# Patient Record
Sex: FEMALE | Race: WHITE
[De-identification: names, ages, dates, MRNs, and addresses within clinical notes are randomized per-mention and may not be internally consistent; named-entity substitution may affect disease eponyms.]

---

## 2018-03-07 ENCOUNTER — HOSPITAL ENCOUNTER (OUTPATIENT)
Dept: HOSPITAL 24 - RAD | Age: 63
Discharge: HOME | DRG: 552 | End: 2018-03-07
Attending: INTERNAL MEDICINE
Payer: COMMERCIAL

## 2018-03-07 DIAGNOSIS — M51.36: Primary | ICD-10-CM

## 2018-03-07 DIAGNOSIS — M53.3: ICD-10-CM

## 2018-03-07 PROCEDURE — 72110 X-RAY EXAM L-2 SPINE 4/>VWS: CPT

## 2018-03-07 PROCEDURE — 73521 X-RAY EXAM HIPS BI 2 VIEWS: CPT

## 2018-03-07 NOTE — RAD
HISTORY:  Bilateral hip pain.  No known trauma.



Study:  Bilateral hips:  Two views each



Comparison:  None



Findings:  



The pelvic ring is intact.  Mild degenerative changes present in the sacroiliac joints.  There is a s
clerotic lesion involving the left sacral ala measuring approximately 18 mm in maximum dimension.  Th
is may be a bone island.  Further evaluation is recommended.  Vascular stents are present in the comm
on iliac arteries.  Moderate vascular calcification is noted in the course of the right common iliac 
artery.  The pubic rami are intact.



Both hips show a normal-appearing acetabulum.  The femoral head contours and femoral necks are intact
.



IMPRESSION:

1.  Sclerotic lesion projected over the left sacral ala of unknown etiology.  This most likely is a b
one island.  Comparison to prior films recommended.  I do not have any available from this facility. 
 As the patient has had iliac artery stents placed, there most likely are prior examinations at [facility] facility.  If these can be made available an addendum to this dictation can be given.

2.  Otherwise, negative radiographs of the hips.







Reported By:Electronically Signed by LANIE GUTIERREZ MD at 3/7/2018 1:30:36 PM

## 2018-03-07 NOTE — RAD
HISTORY:  Low back pain.  Bilateral hip pain.



Study:  Lumbar spine with obliques



Comparison:  None



Findings:  



Five lumbar type vertebra present.  Mild facet arthropathy is noted throughout with mild-to-moderate 
at L3/L4 and L4/L5 and moderate to moderately severe at L5/S1.  Mild degenerative changes noted in th
e sacroiliac joints.  A sclerotic focus is present in the left sacral ala.  Please see dictation of t
he hips from the same day.  Vascular stents are present.  No pars defects are identified.  Moderate a
therosclerotic changes noted in the abdominal aorta.  The lateral view demonstrates 4-5 mm of spondyl
olisthesis of L4 on L5.  Moderate disc space narrowing is noted at L5/S1.  Mild anterior spurring is 
present at several levels.



IMPRESSION:

1.  Lumbar spondylosis as described above.

2.  Sclerotic lesion in the left sacral ala of unknown etiology.  Please see report from the [facility] hips done the same day.







Reported By:Electronically Signed by LANIE GUTIERREZ MD at 3/7/2018 1:35:36 PM

## 2018-04-18 ENCOUNTER — HOSPITAL ENCOUNTER (OUTPATIENT)
Dept: HOSPITAL 24 - RAD | Age: 63
End: 2018-04-18
Attending: INTERNAL MEDICINE
Payer: COMMERCIAL

## 2018-04-18 DIAGNOSIS — M54.5: Primary | ICD-10-CM

## 2018-04-18 PROCEDURE — 72110 X-RAY EXAM L-2 SPINE 4/>VWS: CPT

## 2018-04-18 NOTE — RAD
HISTORY:  Low back pain



Study: Five views of the lumbar spine



Comparison: March 7, 2018



Findings:



Images demonstrate 5 non-rib-bearing lumbar vertebral bodies. The lumbar vertebral body heights are r
elatively maintained. No evidence of acute displaced fracture or significant subluxation is identifie
d. Degenerate facet changes are seen throughout the lumbar spine. The visualized bony structures demo
nstrate diffuse osteopenia. Intervertebral disc space narrowing is noted at L5/S1. Mild multilevel os
teophytosis are demonstrated as well. A sclerotic focus is again seen within the left sacral ala and 
has not significantly changed. Bilateral common iliac artery stents are again noted.



IMPRESSION:

 

1. Degenerative changes as noted above.



Reported By:Electronically Signed by SELENE HAGAN MD at 4/18/2018 2:34:47 PM